# Patient Record
Sex: MALE | Race: WHITE | Employment: STUDENT | URBAN - METROPOLITAN AREA
[De-identification: names, ages, dates, MRNs, and addresses within clinical notes are randomized per-mention and may not be internally consistent; named-entity substitution may affect disease eponyms.]

---

## 2024-04-11 ENCOUNTER — OFFICE VISIT (OUTPATIENT)
Dept: PRIMARY CARE CLINIC | Age: 19
End: 2024-04-11

## 2024-04-11 VITALS
BODY MASS INDEX: 18.94 KG/M2 | OXYGEN SATURATION: 98 % | HEART RATE: 71 BPM | SYSTOLIC BLOOD PRESSURE: 110 MMHG | RESPIRATION RATE: 16 BRPM | TEMPERATURE: 97.7 F | DIASTOLIC BLOOD PRESSURE: 74 MMHG | WEIGHT: 125 LBS | HEIGHT: 68 IN

## 2024-04-11 DIAGNOSIS — K12.0 APHTHOUS ULCER OF MOUTH: Primary | ICD-10-CM

## 2024-04-11 RX ORDER — MONTELUKAST SODIUM 10 MG/1
10 TABLET ORAL NIGHTLY
COMMUNITY

## 2024-04-11 NOTE — PROGRESS NOTES
Chief Complaint:   Mouth Lesions (Started earlier this week with oral ulcers \"has had my whole life on and on\". Getting worse and lasting longer. Used salt water rinse not helping this time. )    History of Present Illness   HPI:  Dereck Stevenson is a 19 y.o. male who presents to [facility] for mouth sores. Has been there for the last 1 week. He has had sores like this before, these are lasting longer and have been larger than normal. Using salt water rinses without much relief. Denies smokeless tobacco, smoking or vaping.  He denies eating a lot of citrusy, acidic foods or candy.  States he was recently at the dentist earlier this year for some fillings.    Prior to Visit Medications    Medication Sig Taking? Authorizing Provider   montelukast (SINGULAIR) 10 MG tablet Take 1 tablet by mouth nightly Yes Provider, MD Jeremy   Magic Mouthwash (MIRACLE MOUTHWASH) Swish and spit 5 mLs 4 times daily as needed for Irritation Yes Trent Spence, APRN - CNP     Review of Systems   Unless otherwise stated in this report or unable to obtain because of the patient's clinical or mental status as evidenced by the medical record, this patients's positive and negative responses for Review of Systems, constitutional, psych, eyes, ENT, cardiovascular, respiratory, gastrointestinal, neurological, genitourinary, musculoskeletal, integument systems and systems related to the presenting problem are either stated in the preceding or were not pertinent or were negative for the symptoms and/or complaints related to the medical problem.    Past Medical History:  has no past medical history on file.   Past Surgical History:  has no past surgical history on file.  Social History:  reports that he has never smoked. He has never been exposed to tobacco smoke. He has never used smokeless tobacco. He reports that he does not currently use alcohol. He reports that he does not currently use drugs after having used the following

## 2024-04-15 ENCOUNTER — OFFICE VISIT (OUTPATIENT)
Dept: PRIMARY CARE CLINIC | Age: 19
End: 2024-04-15

## 2024-04-15 VITALS
BODY MASS INDEX: 19.15 KG/M2 | HEART RATE: 72 BPM | RESPIRATION RATE: 16 BRPM | DIASTOLIC BLOOD PRESSURE: 68 MMHG | OXYGEN SATURATION: 98 % | TEMPERATURE: 98.4 F | WEIGHT: 122 LBS | SYSTOLIC BLOOD PRESSURE: 106 MMHG | HEIGHT: 67 IN

## 2024-04-15 DIAGNOSIS — R50.9 FEVER, UNSPECIFIED FEVER CAUSE: ICD-10-CM

## 2024-04-15 DIAGNOSIS — J03.00 STREP TONSILLITIS: Primary | ICD-10-CM

## 2024-04-15 DIAGNOSIS — B27.90 INFECTIOUS MONONUCLEOSIS WITHOUT COMPLICATION, INFECTIOUS MONONUCLEOSIS DUE TO UNSPECIFIED ORGANISM: ICD-10-CM

## 2024-04-15 LAB
ALBUMIN SERPL-MCNC: 4.6 G/DL (ref 3.5–5.2)
ALP BLD-CCNC: 72 U/L (ref 40–129)
ALT SERPL-CCNC: 24 U/L (ref 0–40)
ANION GAP SERPL CALCULATED.3IONS-SCNC: 10 MMOL/L (ref 7–16)
AST SERPL-CCNC: 32 U/L (ref 0–39)
BASOPHILS ABSOLUTE: 0.07 K/UL (ref 0–0.2)
BASOPHILS RELATIVE PERCENT: 1 % (ref 0–2)
BILIRUB SERPL-MCNC: 1.8 MG/DL (ref 0–1.2)
BUN BLDV-MCNC: 16 MG/DL (ref 6–20)
CALCIUM SERPL-MCNC: 9.6 MG/DL (ref 8.6–10.2)
CHLORIDE BLD-SCNC: 101 MMOL/L (ref 98–107)
CO2: 26 MMOL/L (ref 22–29)
CREAT SERPL-MCNC: 1.2 MG/DL (ref 0.7–1.2)
EOSINOPHILS ABSOLUTE: 0.14 K/UL (ref 0.05–0.5)
EOSINOPHILS RELATIVE PERCENT: 2 % (ref 0–6)
GFR SERPL CREATININE-BSD FRML MDRD: 85 ML/MIN/1.73M2
GLUCOSE BLD-MCNC: 98 MG/DL (ref 74–99)
HCT VFR BLD CALC: 40.6 % (ref 37–54)
HEMOGLOBIN: 13.5 G/DL (ref 12.5–16.5)
HETEROPHILE ANTIBODIES: POSITIVE
INFLUENZA A ANTIBODY: NORMAL
INFLUENZA B ANTIBODY: NORMAL
LYMPHOCYTES ABSOLUTE: 4.26 K/UL (ref 1.5–4)
LYMPHOCYTES RELATIVE PERCENT: 54 % (ref 20–42)
Lab: NORMAL
MCH RBC QN AUTO: 28.1 PG (ref 26–35)
MCHC RBC AUTO-ENTMCNC: 33.3 G/DL (ref 32–34.5)
MCV RBC AUTO: 84.6 FL (ref 80–99.9)
MONOCYTES ABSOLUTE: 0.96 K/UL (ref 0.1–0.95)
MONOCYTES RELATIVE PERCENT: 12 % (ref 2–12)
NEUTROPHILS ABSOLUTE: 2.34 K/UL (ref 1.8–7.3)
NEUTROPHILS RELATIVE PERCENT: 30 % (ref 43–80)
PDW BLD-RTO: 13.1 % (ref 11.5–15)
PERFORMING INSTRUMENT: NORMAL
PLATELET # BLD: 215 K/UL (ref 130–450)
PMV BLD AUTO: 10.6 FL (ref 7–12)
POTASSIUM SERPL-SCNC: 4.6 MMOL/L (ref 3.5–5)
PROMYELOCYTES ABSOLUTE COUNT: 0.14 K/UL
PROMYELOCYTES: 2 %
QC PASS/FAIL: NORMAL
RBC # BLD: 4.8 M/UL (ref 3.8–5.8)
RBC # BLD: ABNORMAL 10*6/UL
S PYO AG THROAT QL: POSITIVE
SARS-COV-2, POC: NORMAL
SODIUM BLD-SCNC: 137 MMOL/L (ref 132–146)
TOTAL PROTEIN: 7.4 G/DL (ref 6.4–8.3)
WBC # BLD: 7.9 K/UL (ref 4.5–11.5)

## 2024-04-15 PROCEDURE — 86308 HETEROPHILE ANTIBODY SCREEN: CPT | Performed by: NURSE PRACTITIONER

## 2024-04-15 PROCEDURE — 87880 STREP A ASSAY W/OPTIC: CPT | Performed by: NURSE PRACTITIONER

## 2024-04-15 PROCEDURE — 36415 COLL VENOUS BLD VENIPUNCTURE: CPT | Performed by: NURSE PRACTITIONER

## 2024-04-15 PROCEDURE — 87804 INFLUENZA ASSAY W/OPTIC: CPT | Performed by: NURSE PRACTITIONER

## 2024-04-15 PROCEDURE — 99214 OFFICE O/P EST MOD 30 MIN: CPT | Performed by: NURSE PRACTITIONER

## 2024-04-15 PROCEDURE — 87426 SARSCOV CORONAVIRUS AG IA: CPT | Performed by: NURSE PRACTITIONER

## 2024-04-15 RX ORDER — PREDNISONE 10 MG/1
TABLET ORAL
Qty: 18 TABLET | Refills: 0 | Status: SHIPPED | OUTPATIENT
Start: 2024-04-15 | End: 2024-04-23

## 2024-04-15 RX ORDER — CEFDINIR 300 MG/1
300 CAPSULE ORAL 2 TIMES DAILY
Qty: 20 CAPSULE | Refills: 0 | Status: SHIPPED | OUTPATIENT
Start: 2024-04-15 | End: 2024-04-25

## 2024-04-15 RX ORDER — IBUPROFEN 200 MG
800 TABLET ORAL ONCE
Status: COMPLETED | OUTPATIENT
Start: 2024-04-15 | End: 2024-04-15

## 2024-04-15 RX ORDER — AMOXICILLIN AND CLAVULANATE POTASSIUM 875; 125 MG/1; MG/1
1 TABLET, FILM COATED ORAL 2 TIMES DAILY
Qty: 20 TABLET | Refills: 0 | Status: SHIPPED
Start: 2024-04-15 | End: 2024-04-15

## 2024-04-15 RX ADMIN — Medication 800 MG: at 10:32

## 2024-04-15 NOTE — PROGRESS NOTES
Chief Complaint:   Congestion (Past 2days very lethargic has had body aches all over fever sore throat with bad headaches)    History of Present Illness   Source of history provided by:  patient.     Dereck Stevenson is a 19 y.o. old male who presents to walk-in for sore throat.  Pt states sore throat began 2 days ago. Reports associated fever (Tmax 102.1F), body aches and headache. Denies any nausea, vomiting, abdominal pain, CP, SOB, cough, or lethargy.  Has been taking nothing for the symptoms. Denies any known sick contacts.          Exposed To:  Streptococcus: no.                             Infectious Mononucleosis: no.      COVID-19: no.    Review of Systems   Unless otherwise stated in this report or unable to obtain because of the patient's clinical or mental status as evidenced by the medical record, this patients's positive and negative responses for Review of Systems, constitutional, psych, eyes, ENT, cardiovascular, respiratory, gastrointestinal, neurological, genitourinary, musculoskeletal, integument systems and systems related to the presenting problem are either stated in the preceding or were not pertinent or were negative for the symptoms and/or complaints related to the medical problem.    Past Medical History:  has no past medical history on file.   Past Surgical History:  has no past surgical history on file.  Social History:  reports that he has never smoked. He has never been exposed to tobacco smoke. He has never used smokeless tobacco. He reports that he does not currently use alcohol. He reports that he does not currently use drugs after having used the following drugs: Marijuana (Weed).  Family History: family history is not on file.  Allergies: Peanut-containing drug products    Physical Exam   Vital Signs:  /68   Pulse 72   Temp (!) 102.1 °F (38.9 °C) (Oral)   Resp 16   Ht 1.702 m (5' 7\")   Wt 55.3 kg (122 lb)   SpO2 98%   BMI 19.11 kg/m²    Oxygen Saturation

## 2024-04-18 LAB
EBV EARLY ANTIGEN IGG: 135 U/ML
EBV INTERPRETATION: ABNORMAL
EBV NUCLEAR AG AB: 18 U/ML
EPSTEIN-BARR VCA IGG: 347 U/ML
EPSTEIN-BARR VCA IGM: 1631 U/ML

## 2025-01-20 PROBLEM — J45.20 MILD INTERMITTENT ASTHMA WITHOUT COMPLICATION: Status: ACTIVE | Noted: 2025-01-20

## 2025-04-29 ENCOUNTER — TELEPHONE (OUTPATIENT)
Dept: SURGERY | Age: 20
End: 2025-04-29

## 2025-04-29 ENCOUNTER — INITIAL CONSULT (OUTPATIENT)
Dept: SURGERY | Age: 20
End: 2025-04-29
Payer: COMMERCIAL

## 2025-04-29 VITALS
BODY MASS INDEX: 18.22 KG/M2 | HEART RATE: 61 BPM | TEMPERATURE: 97.8 F | WEIGHT: 123 LBS | RESPIRATION RATE: 18 BRPM | SYSTOLIC BLOOD PRESSURE: 108 MMHG | HEIGHT: 69 IN | DIASTOLIC BLOOD PRESSURE: 73 MMHG

## 2025-04-29 DIAGNOSIS — R79.89 ELEVATED LFTS: Primary | ICD-10-CM

## 2025-04-29 DIAGNOSIS — K82.8 GALLBLADDER SLUDGE: ICD-10-CM

## 2025-04-29 PROCEDURE — 99203 OFFICE O/P NEW LOW 30 MIN: CPT | Performed by: SURGERY

## 2025-04-29 RX ORDER — CETIRIZINE HYDROCHLORIDE 5 MG/1
5 TABLET ORAL DAILY
COMMUNITY

## 2025-04-29 NOTE — PROGRESS NOTES
General Surgery History and Physical  [facility]    Patient's Name/Date of Birth: Dereck Stevenson / 2005    Date: April 29, 2025     Surgeon: Jonh Bob MD    PCP: Mariel Turner APRN - CNP     Chief Complaint: Elevated LFTs    HPI:   Dereck Stevenson is a 20 y.o. male who presents for evaluation of elevated LFTs.  He had elevation of his total bilirubin on routine labs.  Repeat labs showed downtrending LFTs however still elevated.  He had right upper quadrant ultrasound that revealed gallbladder sludge.  He denies any postprandial abdominal pain or other significant gallbladder related symptoms      Past Medical History:   Diagnosis Date    Asthma     Immunotherapy     Peanut allergy        Past Surgical History:   Procedure Laterality Date    WISDOM TOOTH EXTRACTION  12/2024       Current Outpatient Medications   Medication Sig Dispense Refill    ibuprofen (ADVIL;MOTRIN) 800 MG tablet Take 1 tablet by mouth every 8 hours as needed      vitamin D-3 125 MCG (5000 UT) TABS tablet Take 1 tablet by mouth daily 90 tablet 1    EPINEPHrine (EPIPEN 2-JETT) 0.3 MG/0.3ML SOAJ injection Use as directed for allergic reaction 0.6 mL 5    Magic Mouthwash (MIRACLE MOUTHWASH) Swish and spit 5 mLs 4 times daily as needed for Irritation 100 mL 3    loratadine (CLARITIN) 10 MG tablet Take 1 tablet by mouth daily      albuterol sulfate HFA (VENTOLIN HFA) 108 (90 Base) MCG/ACT inhaler Inhale 2 puffs into the lungs 4 times daily as needed for Wheezing 18 g 5    montelukast (SINGULAIR) 10 MG tablet Take 1 tablet by mouth nightly       No current facility-administered medications for this visit.       Allergies   Allergen Reactions    Peanut-Containing Drug Products Anaphylaxis    Fish-Derived Products Itching     Itchy throat and mouth    Shrimp (Diagnostic)     [place]        The patient has a family history that is negative for severe cardiovascular or respiratory issues, negative for reaction to

## 2025-04-29 NOTE — TELEPHONE ENCOUNTER
Patient to have labs drawn after vacation.  If liver functions trending down, nothing to do at this time.  If they are still elevated, Dr. Bob recommends proceeding with Randi Renee.    Electronically signed by Melissa Heath on 4/29/25 at 8:36 AM EDT